# Patient Record
Sex: FEMALE | Race: WHITE | NOT HISPANIC OR LATINO | Employment: UNEMPLOYED | URBAN - METROPOLITAN AREA
[De-identification: names, ages, dates, MRNs, and addresses within clinical notes are randomized per-mention and may not be internally consistent; named-entity substitution may affect disease eponyms.]

---

## 2023-07-07 ENCOUNTER — OFFICE VISIT (OUTPATIENT)
Dept: URGENT CARE | Facility: CLINIC | Age: 63
End: 2023-07-07
Payer: COMMERCIAL

## 2023-07-07 VITALS
BODY MASS INDEX: 32.27 KG/M2 | OXYGEN SATURATION: 96 % | HEART RATE: 91 BPM | TEMPERATURE: 98.1 F | WEIGHT: 189 LBS | RESPIRATION RATE: 16 BRPM | HEIGHT: 64 IN

## 2023-07-07 DIAGNOSIS — L03.011 PARONYCHIA OF FINGER OF RIGHT HAND: Primary | ICD-10-CM

## 2023-07-07 PROCEDURE — 99203 OFFICE O/P NEW LOW 30 MIN: CPT | Performed by: PHYSICIAN ASSISTANT

## 2023-07-07 RX ORDER — CEPHALEXIN 500 MG/1
500 CAPSULE ORAL EVERY 8 HOURS SCHEDULED
Qty: 15 CAPSULE | Refills: 0 | Status: SHIPPED | OUTPATIENT
Start: 2023-07-07 | End: 2023-07-12

## 2023-07-07 RX ORDER — VENLAFAXINE HYDROCHLORIDE 150 MG/1
150 CAPSULE, EXTENDED RELEASE ORAL EVERY MORNING
COMMUNITY
Start: 2023-06-23

## 2023-07-07 RX ORDER — FLUCONAZOLE 150 MG/1
150 TABLET ORAL ONCE
Qty: 1 TABLET | Refills: 0 | Status: SHIPPED | OUTPATIENT
Start: 2023-07-07 | End: 2023-07-07

## 2023-07-07 RX ORDER — METOPROLOL SUCCINATE 25 MG/1
25 TABLET, EXTENDED RELEASE ORAL EVERY MORNING
COMMUNITY
Start: 2023-06-16

## 2023-07-08 NOTE — PATIENT INSTRUCTIONS
Take antibiotic and use mupirocin as instructed. Discussed red flag symptoms that would warrant return or visit to ER. All patient's questions answered and is agreeable with this plan.

## 2023-07-08 NOTE — PROGRESS NOTES
[facility]        NAME: Jaydon Pepper is a 60 y.o. female  : 1960    MRN: [id number]  DATE: 2023  TIME: 8:07 PM    Assessment and Plan   Paronychia of finger of right hand [L03.011]  1. Paronychia of finger of right hand  cephalexin (KEFLEX) 500 mg capsule    mupirocin (BACTROBAN) 2 % ointment    fluconazole (DIFLUCAN) 150 mg tablet        Placed prescription for fluconazole to be used if develop signs of yeast infection from antibiotic    Patient Instructions     Patient Instructions   Take antibiotic and use mupirocin as instructed. Discussed red flag symptoms that would warrant return or visit to ER. All patient's questions answered and is agreeable with this plan. Follow up with PCP in 3-5 days. Proceed to  ER if symptoms worsen. Chief Complaint     Chief Complaint   Patient presents with   • Finger Pain     R index finger, ripped nail and seems infected- started 2 weeks ago. Warm to touch         History of Present Illness       Patient is a 23-year-old female presenting today with right index finger pain x4 days. Patient notes over the last few days she has had worsening redness and swelling around her right index finger nailbed, has had some purulent discharge and drainage and notes worsening redness and discomfort, states she had a similar occurrence a couple months ago which required oral antibiotics to resolve, states that a few days ago she had a small tear on the outside of her right index finger. Has been applying antibiotic ointment to the area but notes no improvement. Denies fever, numbness, tingling, weakness. Review of Systems   Review of Systems   Constitutional: Negative for chills and fever. HENT: Negative for ear pain and sore throat. Eyes: Negative for pain and visual disturbance. Respiratory: Negative for cough and shortness of breath. Cardiovascular: Negative for chest pain and palpitations.    Gastrointestinal: Negative for abdominal pain and vomiting. Genitourinary: Negative for dysuria and hematuria. Musculoskeletal: Negative for arthralgias and back pain. Skin:        See HPI   Neurological: Negative for seizures and syncope. All other systems reviewed and are negative. Current Medications       Current Outpatient Medications:   •  cephalexin (KEFLEX) 500 mg capsule, Take 1 capsule (500 mg total) by mouth every 8 (eight) hours for 5 days, Disp: 15 capsule, Rfl: 0  •  Cholecalciferol 50 MCG (2000 UT) CAPS, Take 1 capsule by mouth daily, Disp: , Rfl:   •  fluconazole (DIFLUCAN) 150 mg tablet, Take 1 tablet (150 mg total) by mouth once for 1 dose, Disp: 1 tablet, Rfl: 0  •  metoprolol succinate (TOPROL-XL) 25 mg 24 hr tablet, Take 25 mg by mouth every morning, Disp: , Rfl:   •  mupirocin (BACTROBAN) 2 % ointment, Apply topically 3 (three) times a day, Disp: 22 g, Rfl: 0  •  venlafaxine (EFFEXOR-XR) 150 mg 24 hr capsule, Take 150 mg by mouth every morning, Disp: , Rfl:     Current Allergies     Allergies as of 07/07/2023 - Reviewed 07/07/2023   Allergen Reaction Noted   • Fentanyl Hives 01/22/2018   • Sulfa antibiotics Hives 07/07/2023            The following portions of the patient's history were reviewed and updated as appropriate: allergies, current medications, past family history, past medical history, past social history, past surgical history and problem list.     No past medical history on file. No past surgical history on file. No family history on file. Medications have been verified. Objective   Pulse 91   Temp 98.1 °F (36.7 °C)   Resp 16   Ht 5' 4" (1.626 m)   Wt 85.7 kg (189 lb)   SpO2 96%   BMI 32.44 kg/m²        Physical Exam     Physical Exam  Vitals reviewed. Constitutional:       General: She is not in acute distress. Appearance: Normal appearance. HENT:      Head: Normocephalic. Cardiovascular:      Rate and Rhythm: Normal rate. Pulses: Normal pulses.    Pulmonary: Effort: Pulmonary effort is normal.   Skin:     Capillary Refill: Capillary refill takes less than 2 seconds. Comments: Mild redness and swelling to distal aspect of right index finger around nailbed, presence of purulent discharge around lateral aspect of right index finger nailbed, presentation consistent with paronychia, full ROM of right index finger preserved without discomfort, no pain to percussion of flexor surface of right index finger, no streaking   Neurological:      Mental Status: She is alert.